# Patient Record
Sex: MALE | Race: WHITE | NOT HISPANIC OR LATINO
[De-identification: names, ages, dates, MRNs, and addresses within clinical notes are randomized per-mention and may not be internally consistent; named-entity substitution may affect disease eponyms.]

---

## 2019-01-05 ENCOUNTER — TRANSCRIPTION ENCOUNTER (OUTPATIENT)
Age: 54
End: 2019-01-05

## 2019-07-07 ENCOUNTER — TRANSCRIPTION ENCOUNTER (OUTPATIENT)
Age: 54
End: 2019-07-07

## 2020-10-31 ENCOUNTER — TRANSCRIPTION ENCOUNTER (OUTPATIENT)
Age: 55
End: 2020-10-31

## 2020-11-10 ENCOUNTER — TRANSCRIPTION ENCOUNTER (OUTPATIENT)
Age: 55
End: 2020-11-10

## 2021-07-20 ENCOUNTER — APPOINTMENT (OUTPATIENT)
Dept: ENDOCRINOLOGY | Facility: CLINIC | Age: 56
End: 2021-07-20
Payer: COMMERCIAL

## 2021-07-20 VITALS
SYSTOLIC BLOOD PRESSURE: 120 MMHG | BODY MASS INDEX: 33.36 KG/M2 | OXYGEN SATURATION: 98 % | HEIGHT: 70 IN | TEMPERATURE: 98.6 F | WEIGHT: 233 LBS | HEART RATE: 80 BPM | DIASTOLIC BLOOD PRESSURE: 84 MMHG | RESPIRATION RATE: 17 BRPM

## 2021-07-20 DIAGNOSIS — C61 MALIGNANT NEOPLASM OF PROSTATE: ICD-10-CM

## 2021-07-20 DIAGNOSIS — Z83.3 FAMILY HISTORY OF DIABETES MELLITUS: ICD-10-CM

## 2021-07-20 PROCEDURE — 99244 OFF/OP CNSLTJ NEW/EST MOD 40: CPT

## 2021-07-20 PROCEDURE — 99072 ADDL SUPL MATRL&STAF TM PHE: CPT

## 2021-07-20 NOTE — CONSULT LETTER
[Dear  ___] : Dear  [unfilled], [Sincerely,] : Sincerely, [FreeTextEntry1] : Thank you for referring  Mr. HOANG MOSER to me for evaluation and treatment. Please, see attached consultation note. As always, if there are specific questions you would like to discuss, please feel free to contact me.\par Thank you for the courtesy of this evaluation.\par  [FreeTextEntry3] : Mode Becker MD, FACE, ECNU\par

## 2021-07-20 NOTE — HISTORY OF PRESENT ILLNESS
[FreeTextEntry1] : 56 year male  referred for management of thyroid cancer and hypothyroidism.\par Mr Kang was diagnosed with an incidental thyroid nodule on the MRI of the neck in 2017. FNA - PTC. He underwent a total thyroidectomy and CND on 10/11/17 by Dr. Cazares. \par Pathology- classic PTC, 2.5 cm. posterior margins (+) for carcinoma. No angioinvasion/lymphatic invasion. LN 0/1. Stage T2N0Mx\par S/p rh- TSH WBS  followed by a remnant ablation with 75.9 mci of I-131.\par his last thyroid bed US from 12/20 was reportedly unremarkable  , but is not available for review. \par Family history is significant for a hypothyroidism in his mother, but he denies thyroid cancer in his family. \par He was also diagnosed with a non-functional pituitary microadenoma more than 10 years. He was [place] at [facility] [place] and [place]. Willis, but his last MRI was done in 2018, apparently with a stable adenoma vs RCC under 5 mm. Per prior notes, his pituitary panel was normal. He was diagnosed with a Meniere's disease and underwent a cochlear transplant in 06/21, and will not be a candidate for a regular pituitary MRI.\par Currently, there are no local neck symptoms of anterior neck pain or problems with breathing, speaking, or swallowing. \par Patient denies symptoms of hypothyroidism or hyperthyroidism. \par Labs: none recently\par \par

## 2021-07-20 NOTE — REASON FOR VISIT
[Consultation] : a consultation visit [Hypothyroidism] : hypothyroidism [Pituitary Evaluation/ Disorder] : pituitary evaluation/disorder [Thyroid Cancer] : thyroid cancer

## 2021-07-20 NOTE — ASSESSMENT
[FreeTextEntry1] : 1. PTC, s/p total thyroidectomy and remnant APPIAH ablation\par  T2N0Mx\par Current approaches to thyroid cancer management were discussed with patient  and wife in details. \par - obtain most recent thyroid bed/neck US reports from Dr. Cazares\par - next sonogram is tentatively in 12/21\par - Levothyroxine suppressive therapy as well as Tg and Tg a/b monitoring were reviewed \par - TSH goal  0.1-0.5\par - continue L-thyroxine 137 mcg, pending labs\par \par 2. Non-secreting pituitary adenoma\par - obtain prior MRI reports\par - if stable, will likely not require any more imaging, unless presents with new symptoms\par - am pituitary panel\par \par Will review the results over the phone, and if stable, can follow up after the next sonogram.\par

## 2021-07-23 ENCOUNTER — LABORATORY RESULT (OUTPATIENT)
Age: 56
End: 2021-07-23

## 2021-07-28 LAB
25(OH)D3 SERPL-MCNC: 42.1 NG/ML
ACTH SER-ACNC: 16.9 PG/ML
ALBUMIN SERPL ELPH-MCNC: 4.2 G/DL
ALP BLD-CCNC: 74 U/L
ALT SERPL-CCNC: 20 U/L
ANION GAP SERPL CALC-SCNC: 13 MMOL/L
AST SERPL-CCNC: 17 U/L
BASOPHILS # BLD AUTO: 0.03 K/UL
BASOPHILS NFR BLD AUTO: 0.6 %
BILIRUB SERPL-MCNC: 0.7 MG/DL
BUN SERPL-MCNC: 25 MG/DL
CALCIUM SERPL-MCNC: 9.3 MG/DL
CHLORIDE SERPL-SCNC: 106 MMOL/L
CO2 SERPL-SCNC: 22 MMOL/L
CORTIS SERPL-MCNC: 11.6 UG/DL
CREAT SERPL-MCNC: 1.36 MG/DL
EOSINOPHIL # BLD AUTO: 0.21 K/UL
EOSINOPHIL NFR BLD AUTO: 4.3 %
FSH SERPL-MCNC: 2.1 IU/L
GLUCOSE SERPL-MCNC: 95 MG/DL
HCT VFR BLD CALC: 46.1 %
HGB BLD-MCNC: 15.7 G/DL
IGF-1 INTERP: NORMAL
IGF-I BLD-MCNC: 167 NG/ML
IMM GRANULOCYTES NFR BLD AUTO: 0.2 %
LH SERPL-ACNC: 3.1 IU/L
LYMPHOCYTES # BLD AUTO: 1.54 K/UL
LYMPHOCYTES NFR BLD AUTO: 31.8 %
MAN DIFF?: NORMAL
MCHC RBC-ENTMCNC: 28 PG
MCHC RBC-ENTMCNC: 34.1 GM/DL
MCV RBC AUTO: 82.2 FL
MONOCYTES # BLD AUTO: 0.38 K/UL
MONOCYTES NFR BLD AUTO: 7.8 %
NEUTROPHILS # BLD AUTO: 2.68 K/UL
NEUTROPHILS NFR BLD AUTO: 55.3 %
OSMOLALITY SERPL: 304 MOSMOL/KG
OSMOLALITY UR: 933 MOSM/KG
PLATELET # BLD AUTO: 214 K/UL
POTASSIUM SERPL-SCNC: 4.1 MMOL/L
PROLACTIN SERPL-MCNC: 11.2 NG/ML
PROT SERPL-MCNC: 7.6 G/DL
PSA SERPL-MCNC: <0.01 NG/ML
RBC # BLD: 5.61 M/UL
RBC # FLD: 12.8 %
SHBG SERPL-SCNC: 21.6 NMOL/L
SODIUM SERPL-SCNC: 141 MMOL/L
T4 FREE SERPL-MCNC: 1.1 NG/DL
TESTOST BND SERPL-MCNC: 6.7 PG/ML
TESTOSTERONE TOTAL S: 226 NG/DL
THYROGLOB AB SERPL-ACNC: <20 IU/ML
THYROGLOB SERPL-MCNC: <0.2 NG/ML
TSH SERPL-ACNC: 4.16 UIU/ML
WBC # FLD AUTO: 4.85 K/UL

## 2022-02-07 ENCOUNTER — APPOINTMENT (OUTPATIENT)
Dept: ENDOCRINOLOGY | Facility: CLINIC | Age: 57
End: 2022-02-07
Payer: COMMERCIAL

## 2022-02-07 VITALS
SYSTOLIC BLOOD PRESSURE: 110 MMHG | BODY MASS INDEX: 33.5 KG/M2 | WEIGHT: 234 LBS | RESPIRATION RATE: 16 BRPM | OXYGEN SATURATION: 97 % | HEART RATE: 69 BPM | HEIGHT: 70 IN | TEMPERATURE: 97.8 F | DIASTOLIC BLOOD PRESSURE: 60 MMHG

## 2022-02-07 PROCEDURE — 36415 COLL VENOUS BLD VENIPUNCTURE: CPT

## 2022-02-07 PROCEDURE — 99214 OFFICE O/P EST MOD 30 MIN: CPT | Mod: 25

## 2022-02-07 NOTE — REASON FOR VISIT
[Follow - Up] : a follow-up visit [Hypothyroidism] : hypothyroidism [Pituitary Evaluation/ Disorder] : pituitary evaluation/disorder [Thyroid Cancer] : thyroid cancer

## 2022-02-07 NOTE — HISTORY OF PRESENT ILLNESS
[FreeTextEntry1] : 57 year male  f/u for multiple medical issues\par \par \par *** Feb 07, 2022 ***\par \par feels well. states that his T levels were low for about 10 years. He's been briefly on TRT (gel) with some improvement, but last time was using about 2 years ago\par s/p prostatectomy in 10/2019 for a presumably a "early cancer". Under care of Dr. Aquino- last visit about 2 months ago.\par taking L-thyroxine 150 mcg\par \par HPI:\par Mr Kang was diagnosed with an incidental thyroid nodule on the MRI of the neck in 2017. FNA - PTC. He underwent a total thyroidectomy and CND on 10/11/17 by Dr. Cazares. \par Pathology- classic PTC, 2.5 cm. posterior margins (+) for carcinoma. No angioinvasion/lymphatic invasion. LN 0/1. Stage T2N0Mx\par S/p rh- TSH WBS  followed by a remnant ablation with 75.9 mci of I-131.\par his last thyroid bed US from 12/20 was reportedly unremarkable  , but is not available for review. \par Family history is significant for a hypothyroidism in his mother, but he denies thyroid cancer in his family. \par He was also diagnosed with a non-functional pituitary microadenoma more than 10 years. He was followed at [facility] [place] and [place]. Willis, but his last MRI was done in 2018, apparently with a stable adenoma vs RCC under 5 mm. Per prior notes, his pituitary panel was normal. He was diagnosed with a Meniere's disease and underwent a cochlear transplant in 06/21, and will not be a candidate for a regular pituitary MRI.\par Currently, there are no local neck symptoms of anterior neck pain or problems with breathing, speaking, or swallowing. \par Patient denies symptoms of hypothyroidism or hyperthyroidism. \par Labs: none recently\par \par

## 2022-02-07 NOTE — ASSESSMENT
[FreeTextEntry1] : 1. PTC, s/p total thyroidectomy and remnant APPIAH ablation\par  T2N0Mx\par Current approaches to thyroid cancer management were discussed with patient  and wife in details. \par - obtain most recent thyroid bed/neck US reports from Dr. Cazares\par - for sonogram next week\par - Levothyroxine suppressive therapy as well as Tg and Tg a/b monitoring were reviewed \par - TSH goal  0.1-0.5\par - continue L-thyroxine 150 mcg, pending labs\par \par 2. Non-secreting pituitary adenoma\par - obtain prior MRI reports\par - if stable, will likely not require any more imaging, unless presents with new symptoms\par - am pituitary panel\par \par 3. Hypogonadism. ? h/o prostate cancer\par - patient will inquire with Dr. Aquino about the staging and safety using TRT\par Will review the results over the phone, and if stable, can follow up in 6 months\par

## 2022-02-09 LAB
25(OH)D3 SERPL-MCNC: 23.3 NG/ML
ALBUMIN SERPL ELPH-MCNC: 4.6 G/DL
ALP BLD-CCNC: 89 U/L
ALT SERPL-CCNC: 26 U/L
ANION GAP SERPL CALC-SCNC: 16 MMOL/L
AST SERPL-CCNC: 18 U/L
BASOPHILS # BLD AUTO: 0.05 K/UL
BASOPHILS NFR BLD AUTO: 1 %
BILIRUB SERPL-MCNC: 0.5 MG/DL
BUN SERPL-MCNC: 28 MG/DL
CALCIUM SERPL-MCNC: 9.4 MG/DL
CHLORIDE SERPL-SCNC: 107 MMOL/L
CHOLEST SERPL-MCNC: 197 MG/DL
CO2 SERPL-SCNC: 20 MMOL/L
CREAT SERPL-MCNC: 1.29 MG/DL
EOSINOPHIL # BLD AUTO: 0.23 K/UL
EOSINOPHIL NFR BLD AUTO: 4.7 %
ESTIMATED AVERAGE GLUCOSE: 103 MG/DL
FSH SERPL-MCNC: 2.2 IU/L
GLUCOSE SERPL-MCNC: 110 MG/DL
HBA1C MFR BLD HPLC: 5.2 %
HCT VFR BLD CALC: 49.7 %
HDLC SERPL-MCNC: 50 MG/DL
HGB BLD-MCNC: 16.4 G/DL
IGF-1 INTERP: NORMAL
IGF-I BLD-MCNC: 184 NG/ML
IMM GRANULOCYTES NFR BLD AUTO: 0 %
LDLC SERPL CALC-MCNC: 130 MG/DL
LH SERPL-ACNC: 4.5 IU/L
LYMPHOCYTES # BLD AUTO: 1.49 K/UL
LYMPHOCYTES NFR BLD AUTO: 30.2 %
MAN DIFF?: NORMAL
MCHC RBC-ENTMCNC: 27.7 PG
MCHC RBC-ENTMCNC: 33 GM/DL
MCV RBC AUTO: 84.1 FL
MONOCYTES # BLD AUTO: 0.33 K/UL
MONOCYTES NFR BLD AUTO: 6.7 %
NEUTROPHILS # BLD AUTO: 2.84 K/UL
NEUTROPHILS NFR BLD AUTO: 57.4 %
NONHDLC SERPL-MCNC: 147 MG/DL
OSMOLALITY SERPL: 300 MOSMOL/KG
OSMOLALITY UR: 1086 MOSM/KG
PLATELET # BLD AUTO: 275 K/UL
POTASSIUM SERPL-SCNC: 4.4 MMOL/L
PROLACTIN SERPL-MCNC: 11.8 NG/ML
PROT SERPL-MCNC: 8.3 G/DL
PSA SERPL-MCNC: <0.01 NG/ML
RBC # BLD: 5.91 M/UL
RBC # FLD: 13 %
SHBG SERPL-SCNC: 18 NMOL/L
SODIUM SERPL-SCNC: 143 MMOL/L
T4 FREE SERPL-MCNC: 1.5 NG/DL
THYROGLOB AB SERPL-ACNC: <20 IU/ML
THYROGLOB SERPL-MCNC: <0.2 NG/ML
TRIGL SERPL-MCNC: 85 MG/DL
TSH SERPL-ACNC: 0.53 UIU/ML
WBC # FLD AUTO: 4.94 K/UL

## 2022-07-22 ENCOUNTER — RX RENEWAL (OUTPATIENT)
Age: 57
End: 2022-07-22

## 2022-08-01 ENCOUNTER — APPOINTMENT (OUTPATIENT)
Dept: ENDOCRINOLOGY | Facility: CLINIC | Age: 57
End: 2022-08-01

## 2022-08-01 VITALS
BODY MASS INDEX: 33.5 KG/M2 | TEMPERATURE: 98 F | OXYGEN SATURATION: 98 % | SYSTOLIC BLOOD PRESSURE: 122 MMHG | DIASTOLIC BLOOD PRESSURE: 70 MMHG | HEART RATE: 70 BPM | WEIGHT: 234 LBS | RESPIRATION RATE: 16 BRPM | HEIGHT: 70 IN

## 2022-08-01 LAB
BASOPHILS # BLD AUTO: 0.04 K/UL
BASOPHILS NFR BLD AUTO: 0.7 %
EOSINOPHIL # BLD AUTO: 0.3 K/UL
EOSINOPHIL NFR BLD AUTO: 5.2 %
HCT VFR BLD CALC: 47.1 %
HGB BLD-MCNC: 16.2 G/DL
IMM GRANULOCYTES NFR BLD AUTO: 0.2 %
LYMPHOCYTES # BLD AUTO: 1.42 K/UL
LYMPHOCYTES NFR BLD AUTO: 24.7 %
MAN DIFF?: NORMAL
MCHC RBC-ENTMCNC: 28.3 PG
MCHC RBC-ENTMCNC: 34.4 GM/DL
MCV RBC AUTO: 82.2 FL
MONOCYTES # BLD AUTO: 0.37 K/UL
MONOCYTES NFR BLD AUTO: 6.4 %
NEUTROPHILS # BLD AUTO: 3.61 K/UL
NEUTROPHILS NFR BLD AUTO: 62.8 %
PLATELET # BLD AUTO: 254 K/UL
RBC # BLD: 5.73 M/UL
RBC # FLD: 12.8 %
WBC # FLD AUTO: 5.75 K/UL

## 2022-08-01 PROCEDURE — 99214 OFFICE O/P EST MOD 30 MIN: CPT | Mod: 25

## 2022-08-01 PROCEDURE — 36415 COLL VENOUS BLD VENIPUNCTURE: CPT

## 2022-08-01 NOTE — ASSESSMENT
[FreeTextEntry1] : 1. PTC, s/p total thyroidectomy and remnant APPIAH ablation\par  T2N0Mx\par Current approaches to thyroid cancer management were discussed with patient  and wife in details. \par - obtain most recent thyroid bed/neck US reports from Dr. Cazares and repeat in 1-2 years\par - Levothyroxine suppressive therapy as well as Tg and Tg a/b monitoring were reviewed \par - TSH goal  0.1-0.5\par - continue L-thyroxine 150 mcg, pending labs\par \par 2. Non-secreting pituitary adenoma\par - obtain prior MRI reports\par - if stable, will likely not require any more imaging, unless presents with new symptoms\par - am pituitary panel\par \par 3. Hypogonadism. ? h/o prostate cancer\par - will recheck testo levels, but if ever considered for a TRT would need to channel this via his urologist  \par \par 4. Obesity\par - Current approaches to weight management are discussed with the patient. \par Suggested extensive nutritional education program. Proper dietary restrictions and exercise routines discussed. Medical weight loss therapies were reviewed with the patient- at present will focus on dietary and LSM changes\par Will also refer to Isatu Kerr DNP to discuss medical marijuana use\par \par if stable, can follow up in 1 year\par

## 2022-08-01 NOTE — HISTORY OF PRESENT ILLNESS
[FreeTextEntry1] : 57 year male  f/u for multiple medical issues\par \par *** Aug 01, 2022 ***\par \par feels ok overall, except for putting on some weight and feeling tired. diet is worse- skipping breakfast and lunch, having a large dinner . large in cho\par he spoke with Dr. Aquino- no TRT advised\par still with vertigo attacks and  nausea due to his Menieres disease (had a cochlear implant last year). was advised on CBD to help with his symptoms \par had a thyroid sonogram in 02/22 with Dr. Cazares- planned another one in 2 years\par \par *** Feb 07, 2022 ***\par \par feels well. states that his T levels were low for about 10 years. He's been briefly on TRT (gel) with some improvement, but last time was using about 2 years ago\par s/p prostatectomy in 10/2019 for a presumably a "early cancer". Under care of Dr. Aquino- last visit about 2 months ago.\par taking L-thyroxine 150 mcg\par \par HPI:\par Mr Kang was diagnosed with an incidental thyroid nodule on the MRI of the neck in 2017. FNA - PTC. He underwent a total thyroidectomy and CND on 10/11/17 by Dr. Cazares. \par Pathology- classic PTC, 2.5 cm. posterior margins (+) for carcinoma. No angioinvasion/lymphatic invasion. LN 0/1. Stage T2N0Mx\par S/p rh- TSH WBS  followed by a remnant ablation with 75.9 mci of I-131.\par his last thyroid bed US from 12/20 was reportedly unremarkable  , but is not available for review. \par Family history is significant for a hypothyroidism in his mother, but he denies thyroid cancer in his family. \par He was also diagnosed with a non-functional pituitary microadenoma more than 10 years. [place] was [place] [facility] [place], but his last MRI was done in 2018, apparently with a stable adenoma vs RCC under 5 mm. Per prior notes, his pituitary panel was normal. He was diagnosed with a Meniere's disease and underwent a cochlear transplant in 06/21, and will not be a candidate for a regular pituitary MRI.\par Currently, there are no local neck symptoms of anterior neck pain or problems with breathing, speaking, or swallowing. \par Patient denies symptoms of hypothyroidism or hyperthyroidism. \par Labs: none recently\par \par

## 2022-08-04 LAB
ALBUMIN SERPL ELPH-MCNC: 4.5 G/DL
ALP BLD-CCNC: 81 U/L
ALT SERPL-CCNC: 35 U/L
ANION GAP SERPL CALC-SCNC: 13 MMOL/L
AST SERPL-CCNC: 24 U/L
BILIRUB SERPL-MCNC: 0.7 MG/DL
BUN SERPL-MCNC: 22 MG/DL
CALCIUM SERPL-MCNC: 9.4 MG/DL
CHLORIDE SERPL-SCNC: 107 MMOL/L
CO2 SERPL-SCNC: 23 MMOL/L
CREAT SERPL-MCNC: 1.17 MG/DL
EGFR: 73 ML/MIN/1.73M2
FSH SERPL-MCNC: 2.4 IU/L
GLUCOSE SERPL-MCNC: 104 MG/DL
IGF-1 INTERP: NORMAL
IGF-I BLD-MCNC: 171 NG/ML
LH SERPL-ACNC: 5.3 IU/L
POTASSIUM SERPL-SCNC: 4.2 MMOL/L
PROT SERPL-MCNC: 7.9 G/DL
PSA SERPL-MCNC: <0.01 NG/ML
SHBG SERPL-SCNC: 29.4 NMOL/L
SODIUM SERPL-SCNC: 142 MMOL/L
T4 FREE SERPL-MCNC: 1.9 NG/DL
THYROGLOB AB SERPL-ACNC: <20 IU/ML
THYROGLOB SERPL-MCNC: <0.2 NG/ML
TSH SERPL-ACNC: 0.06 UIU/ML

## 2022-08-05 LAB
TESTOST FREE SERPL-MCNC: 4.8 PG/ML
TESTOST SERPL-MCNC: 239 NG/DL

## 2022-09-22 ENCOUNTER — APPOINTMENT (OUTPATIENT)
Dept: FAMILY MEDICINE | Facility: CLINIC | Age: 57
End: 2022-09-22

## 2022-09-22 DIAGNOSIS — H81.09 MENIERE'S DISEASE, UNSPECIFIED EAR: ICD-10-CM

## 2022-09-22 PROCEDURE — 99214 OFFICE O/P EST MOD 30 MIN: CPT

## 2022-09-22 NOTE — HISTORY OF PRESENT ILLNESS
[de-identified] : hx of severe Menieres disease, had labyrinthectomy, and cochlear implant. history of prostate and thyroid.\par severe vertigo, much improved, however, continues to have "waves of nausea, but no vomiting. \par referred by Dr. Becker for medical marijuana.\par cannot tolerate alcohol, and does not take any recreational drugs. \par retired [employer]. has a primary care\par \par

## 2023-06-14 ENCOUNTER — NON-APPOINTMENT (OUTPATIENT)
Age: 58
End: 2023-06-14

## 2023-08-02 ENCOUNTER — APPOINTMENT (OUTPATIENT)
Dept: ENDOCRINOLOGY | Facility: CLINIC | Age: 58
End: 2023-08-02
Payer: MEDICARE

## 2023-08-02 VITALS
HEART RATE: 59 BPM | BODY MASS INDEX: 32.93 KG/M2 | RESPIRATION RATE: 16 BRPM | DIASTOLIC BLOOD PRESSURE: 78 MMHG | SYSTOLIC BLOOD PRESSURE: 124 MMHG | HEIGHT: 70 IN | WEIGHT: 230 LBS | TEMPERATURE: 97.5 F | OXYGEN SATURATION: 97 %

## 2023-08-02 DIAGNOSIS — E23.7 DISORDER OF PITUITARY GLAND, UNSPECIFIED: ICD-10-CM

## 2023-08-02 PROCEDURE — 36415 COLL VENOUS BLD VENIPUNCTURE: CPT

## 2023-08-02 PROCEDURE — 99214 OFFICE O/P EST MOD 30 MIN: CPT | Mod: 25

## 2023-08-02 NOTE — ASSESSMENT
[FreeTextEntry1] : 1. PTC, s/p total thyroidectomy and remnant APPIAH ablation  T2N0Mx Current approaches to thyroid cancer management were discussed with patient  and wife in details.  - obtain most recent thyroid bed/neck US reports from Dr. Cazares and repeat in 02/24 - labs today - Levothyroxine suppressive therapy as well as Tg and Tg a/b monitoring were reviewed  - TSH goal  0.1-0.5 - continue L-thyroxine 137 mcg, pending labs  2. Non-secreting pituitary adenoma - obtain prior MRI reports - if stable, will likely not require any more imaging, unless presents with new symptoms - am pituitary panel  3. Hypogonadism.? h/o prostate cancer - will recheck testo levels, but if ever considered for a TRT would need to channel this via his urologist    4. Obesity - Current approaches to weight management are discussed with the patient.  Suggested extensive nutritional education program. Proper dietary restrictions and exercise routines discussed. Medical weight loss therapies were reviewed with the patient- at present he is leaving to [place] for several months and still wants to focus on dietary and LSM changes.  if stable, can follow up in 1 year

## 2023-08-02 NOTE — HISTORY OF PRESENT ILLNESS
[FreeTextEntry1] : 58 year male  f/u for multiple medical issues  *** Aug 02, 2023 ***  feels better overall. still dealing with Menieres disease. vertigo has improved. using medical CBD with some relief on Synthroid 137 mcg. denies anxiety/ heart racing no recent labs saw Dr. Cazares a month ago- reports normal exam. next sonogram is planned for 02/24 prostate cancer is under control- saw urologist a month ago  *** Aug 01, 2022 ***  feels ok overall, except for putting on some weight and feeling tired. diet is worse- skipping breakfast and lunch, having a large dinner . large in cho he spoke with Dr. Aquino- no TRT advised still with vertigo attacks and  nausea due to his Menieres disease (had a cochlear implant last year). was advised on CBD to help with his symptoms  had a thyroid sonogram in 02/22 with Dr. Cazares- planned another one in 2 years  *** Feb 07, 2022 ***  feels well. states that his T levels were low for about 10 years. He's been briefly on TRT (gel) with some improvement, but last time was using about 2 years ago s/p prostatectomy in 10/2019 for a presumably a "early cancer". Under care of Dr. Aquino- last visit about 2 months ago. taking L-thyroxine 150 mcg  HPI: Mr Kang was diagnosed with an incidental thyroid nodule on the MRI of the neck in 2017. FNA - PTC. He underwent a total thyroidectomy and CND on 10/11/17 by Dr. Cazares.  Pathology- classic PTC, 2.5 cm. posterior margins (+) for carcinoma. No angioinvasion/lymphatic invasion. LN 0/1. Stage T2N0Mx S/p rh- TSH WBS  followed by a remnant ablation with 75.9 mci of I-131. his last thyroid bed US from 12/20 was reportedly unremarkable  , but is not available for review.  Family history is significant for a hypothyroidism in his mother, but he denies thyroid cancer in his family.  He was also diagnosed with a non-functional pituitary microadenoma more than 10 years. [place] [facility] [place] but his last MRI was done in 2018, apparently with a stable adenoma vs RCC under 5 mm. Per prior notes, his pituitary panel was normal. He was diagnosed with a Meniere's disease and underwent a cochlear transplant in 06/21, and will not be a candidate for a regular pituitary MRI. Currently, there are no local neck symptoms of anterior neck pain or problems with breathing, speaking, or swallowing.  Patient denies symptoms of hypothyroidism or hyperthyroidism.  Labs: none recently

## 2023-08-05 LAB
ALBUMIN SERPL ELPH-MCNC: 4.4 G/DL
ALP BLD-CCNC: 61 U/L
ALT SERPL-CCNC: 20 U/L
ANION GAP SERPL CALC-SCNC: 13 MMOL/L
AST SERPL-CCNC: 17 U/L
BILIRUB SERPL-MCNC: 0.6 MG/DL
BUN SERPL-MCNC: 25 MG/DL
CALCIUM SERPL-MCNC: 9.5 MG/DL
CHLORIDE SERPL-SCNC: 106 MMOL/L
CO2 SERPL-SCNC: 21 MMOL/L
CORTIS SERPL-MCNC: 10.4 UG/DL
CREAT SERPL-MCNC: 1.14 MG/DL
EGFR: 75 ML/MIN/1.73M2
ESTIMATED AVERAGE GLUCOSE: 100 MG/DL
FSH SERPL-MCNC: 2 IU/L
GLUCOSE SERPL-MCNC: 96 MG/DL
HBA1C MFR BLD HPLC: 5.1 %
IGF-1 INTERP: NORMAL
IGF-I BLD-MCNC: 160 NG/ML
LH SERPL-ACNC: 3.2 IU/L
OSMOLALITY SERPL: 301 MOSMOL/KG
POTASSIUM SERPL-SCNC: 4.5 MMOL/L
PROLACTIN SERPL-MCNC: 8.1 NG/ML
PROT SERPL-MCNC: 7.3 G/DL
SODIUM SERPL-SCNC: 140 MMOL/L
T4 FREE SERPL-MCNC: 1.5 NG/DL
THYROGLOB AB SERPL-ACNC: <20 IU/ML
THYROGLOB SERPL-MCNC: <0.2 NG/ML
TSH SERPL-ACNC: 0.86 UIU/ML

## 2023-12-06 ENCOUNTER — RX RENEWAL (OUTPATIENT)
Age: 58
End: 2023-12-06

## 2024-02-29 ENCOUNTER — APPOINTMENT (OUTPATIENT)
Dept: ORTHOPEDIC SURGERY | Facility: CLINIC | Age: 59
End: 2024-02-29

## 2024-05-29 ENCOUNTER — APPOINTMENT (OUTPATIENT)
Dept: ENDOCRINOLOGY | Facility: CLINIC | Age: 59
End: 2024-05-29
Payer: MEDICARE

## 2024-05-29 VITALS
OXYGEN SATURATION: 98 % | WEIGHT: 231 LBS | HEART RATE: 69 BPM | BODY MASS INDEX: 33.07 KG/M2 | SYSTOLIC BLOOD PRESSURE: 130 MMHG | DIASTOLIC BLOOD PRESSURE: 90 MMHG | RESPIRATION RATE: 16 BRPM | HEIGHT: 70 IN

## 2024-05-29 DIAGNOSIS — E89.0 POSTPROCEDURAL HYPOTHYROIDISM: ICD-10-CM

## 2024-05-29 DIAGNOSIS — E29.1 TESTICULAR HYPOFUNCTION: ICD-10-CM

## 2024-05-29 DIAGNOSIS — E66.9 OBESITY, UNSPECIFIED: ICD-10-CM

## 2024-05-29 DIAGNOSIS — D35.2 BENIGN NEOPLASM OF PITUITARY GLAND: ICD-10-CM

## 2024-05-29 DIAGNOSIS — C73 MALIGNANT NEOPLASM OF THYROID GLAND: ICD-10-CM

## 2024-05-29 LAB
BASOPHILS # BLD AUTO: 0.06 K/UL
BASOPHILS NFR BLD AUTO: 1.1 %
EOSINOPHIL # BLD AUTO: 0.21 K/UL
EOSINOPHIL NFR BLD AUTO: 3.8 %
HCT VFR BLD CALC: 46.4 %
HGB BLD-MCNC: 16 G/DL
IMM GRANULOCYTES NFR BLD AUTO: 0.4 %
LYMPHOCYTES # BLD AUTO: 1.31 K/UL
LYMPHOCYTES NFR BLD AUTO: 23.7 %
MAN DIFF?: NORMAL
MCHC RBC-ENTMCNC: 28.5 PG
MCHC RBC-ENTMCNC: 34.5 GM/DL
MCV RBC AUTO: 82.6 FL
MONOCYTES # BLD AUTO: 0.46 K/UL
MONOCYTES NFR BLD AUTO: 8.3 %
NEUTROPHILS # BLD AUTO: 3.47 K/UL
NEUTROPHILS NFR BLD AUTO: 62.7 %
PLATELET # BLD AUTO: 271 K/UL
RBC # BLD: 5.62 M/UL
RBC # FLD: 12.9 %
WBC # FLD AUTO: 5.53 K/UL

## 2024-05-29 PROCEDURE — G2211 COMPLEX E/M VISIT ADD ON: CPT

## 2024-05-29 PROCEDURE — 99214 OFFICE O/P EST MOD 30 MIN: CPT

## 2024-05-29 PROCEDURE — 36415 COLL VENOUS BLD VENIPUNCTURE: CPT

## 2024-05-29 NOTE — HISTORY OF PRESENT ILLNESS
[FreeTextEntry1] : 59 year male  f/u for multiple medical issues  *** May 29, 2024 ***  doing well, offers no new c/o staying on Synthroid 137 mcg saw Dr. Cazares a month ago, no recent sono, labs are available to me struggles with the weight despite walking several miles daily, trying to lkeep his diet  *** Aug 02, 2023 ***  feels better overall. still dealing with Menieres disease. vertigo has improved. using medical CBD with some relief on Synthroid 137 mcg. denies anxiety/ heart racing no recent labs saw Dr. Cazares a month ago- reports normal exam. next sonogram is planned for 02/24 prostate cancer is under control- saw urologist a month ago  *** Aug 01, 2022 ***  feels ok overall, except for putting on some weight and feeling tired. diet is worse- skipping breakfast and lunch, having a large dinner . large in cho he spoke with Dr. Aquino- no TRT advised still with vertigo attacks and  nausea due to his Menieres disease (had a cochlear implant last year). was advised on CBD to help with his symptoms  had a thyroid sonogram in 02/22 with Dr. Cazares- planned another one in 2 years  *** Feb 07, 2022 ***  feels well. states that his T levels were low for about 10 years. He's been briefly on TRT (gel) with some improvement, but last time was using about 2 years ago s/p prostatectomy in 10/2019 for a presumably a "early cancer". Under care of Dr. Aquino- last visit about 2 months ago. taking L-thyroxine 150 mcg  HPI: Mr Kang was diagnosed with an incidental thyroid nodule on the MRI of the neck in 2017. FNA - PTC. He underwent a total thyroidectomy and CND on 10/11/17 by Dr. Cazares.  Pathology- classic PTC, 2.5 cm. posterior margins (+) for carcinoma. No angioinvasion/lymphatic invasion. LN 0/1. Stage T2N0Mx S/p rh- TSH WBS  followed by a remnant ablation with 75.9 mci of I-131. his last thyroid bed US from 12/20 was reportedly unremarkable  , but is not available for review.  Family history is significant for a hypothyroidism in his mother, but he denies thyroid cancer in his family.  He was also diagnosed with a non-functional pituitary microadenoma more than 10 years. He was followed at [facility] [place] and [place]. Willis, but his last MRI was done in 2018, apparently with a stable adenoma vs RCC under 5 mm. Per prior notes, his pituitary panel was normal. He was diagnosed with a Meniere's disease and underwent a cochlear transplant in 06/21, and will not be a candidate for a regular pituitary MRI. Currently, there are no local neck symptoms of anterior neck pain or problems with breathing, speaking, or swallowing.  Patient denies symptoms of hypothyroidism or hyperthyroidism.  Labs: none recently

## 2024-05-29 NOTE — ASSESSMENT
[FreeTextEntry1] : 1. PTC, s/p total thyroidectomy and remnant APPIAH ablation  T2N0Mx Current approaches to thyroid cancer management were discussed with patient  and wife in details.  - obtain most recent thyroid bed/neck US reports from Dr. Cazares - labs today - Levothyroxine suppressive therapy as well as Tg and Tg a/b monitoring were reviewed  - TSH goal  0.1-0.5 - continue L-thyroxine 137 mcg, pending labs  2. Non-secreting pituitary adenoma - obtain prior MRI reports - if stable, will likely not require any more imaging, unless presents with new symptoms - am pituitary panel  3. Hypogonadism.? h/o prostate cancer - will recheck testo levels, but if ever considered for a TRT would need to channel this via his urologist    4. Obesity - Current approaches to weight management are discussed with the patient.  Suggested extensive nutritional education program. Proper dietary restrictions and exercise routines discussed. Medical weight loss therapies were reviewed with the patient, Advised on R+B of GLP1RA, will start Wegovy 0.25 mg qw and uptitrate as directed  RTC 3-4 months if starts Wegovy

## 2024-05-30 LAB
ALBUMIN SERPL ELPH-MCNC: 4.5 G/DL
ALP BLD-CCNC: 72 U/L
ALT SERPL-CCNC: 25 U/L
ANION GAP SERPL CALC-SCNC: 21 MMOL/L
AST SERPL-CCNC: 19 U/L
BILIRUB SERPL-MCNC: 0.6 MG/DL
BUN SERPL-MCNC: 23 MG/DL
CALCIUM SERPL-MCNC: 10 MG/DL
CHLORIDE SERPL-SCNC: 108 MMOL/L
CHOLEST SERPL-MCNC: 203 MG/DL
CO2 SERPL-SCNC: 16 MMOL/L
CORTIS SERPL-MCNC: 8.9 UG/DL
CREAT SERPL-MCNC: 1.19 MG/DL
EGFR: 71 ML/MIN/1.73M2
ESTIMATED AVERAGE GLUCOSE: 103 MG/DL
FSH SERPL-MCNC: 2.2 IU/L
GLUCOSE SERPL-MCNC: 103 MG/DL
HBA1C MFR BLD HPLC: 5.2 %
HDLC SERPL-MCNC: 55 MG/DL
IGF-1 INTERP: NORMAL
IGF-I BLD-MCNC: 162 NG/ML
LDLC SERPL CALC-MCNC: 128 MG/DL
LH SERPL-ACNC: 5.6 IU/L
NONHDLC SERPL-MCNC: 148 MG/DL
OSMOLALITY SERPL: 294 MOSMOL/KG
POTASSIUM SERPL-SCNC: 4.5 MMOL/L
PROLACTIN SERPL-MCNC: 8.1 NG/ML
PROT SERPL-MCNC: 7.9 G/DL
PSA SERPL-MCNC: <0.01 NG/ML
SHBG SERPL-SCNC: 26.6 NMOL/L
SODIUM SERPL-SCNC: 144 MMOL/L
T4 FREE SERPL-MCNC: 1.3 NG/DL
TRIGL SERPL-MCNC: 109 MG/DL
TSH SERPL-ACNC: 1.41 UIU/ML

## 2024-05-30 RX ORDER — LEVOTHYROXINE SODIUM 0.15 MG/1
150 TABLET ORAL
Qty: 90 | Refills: 3 | Status: ACTIVE | COMMUNITY
Start: 2022-07-22 | End: 1900-01-01

## 2024-05-31 LAB
TESTOST FREE SERPL-MCNC: 2.7 PG/ML
TESTOST SERPL-MCNC: 183 NG/DL

## 2024-06-04 RX ORDER — SEMAGLUTIDE 0.25 MG/.5ML
0.25 INJECTION, SOLUTION SUBCUTANEOUS
Qty: 1 | Refills: 5 | Status: ACTIVE | COMMUNITY
Start: 2024-05-29

## 2024-09-20 ENCOUNTER — RX RENEWAL (OUTPATIENT)
Age: 59
End: 2024-09-20

## 2024-09-20 RX ORDER — LEVOTHYROXINE SODIUM 0.14 MG/1
137 TABLET ORAL
Qty: 90 | Refills: 1 | Status: ACTIVE | COMMUNITY
Start: 2024-09-20 | End: 1900-01-01

## 2025-01-15 ENCOUNTER — APPOINTMENT (OUTPATIENT)
Dept: ENDOCRINOLOGY | Facility: CLINIC | Age: 60
End: 2025-01-15

## 2025-03-19 ENCOUNTER — APPOINTMENT (OUTPATIENT)
Dept: ENDOCRINOLOGY | Facility: CLINIC | Age: 60
End: 2025-03-19
Payer: MEDICARE

## 2025-03-19 VITALS
OXYGEN SATURATION: 95 % | DIASTOLIC BLOOD PRESSURE: 78 MMHG | HEIGHT: 70 IN | SYSTOLIC BLOOD PRESSURE: 135 MMHG | WEIGHT: 228 LBS | BODY MASS INDEX: 32.64 KG/M2 | RESPIRATION RATE: 16 BRPM | HEART RATE: 75 BPM

## 2025-03-19 DIAGNOSIS — E29.1 TESTICULAR HYPOFUNCTION: ICD-10-CM

## 2025-03-19 DIAGNOSIS — D35.2 BENIGN NEOPLASM OF PITUITARY GLAND: ICD-10-CM

## 2025-03-19 DIAGNOSIS — C73 MALIGNANT NEOPLASM OF THYROID GLAND: ICD-10-CM

## 2025-03-19 DIAGNOSIS — E66.9 OBESITY, UNSPECIFIED: ICD-10-CM

## 2025-03-19 DIAGNOSIS — E89.0 POSTPROCEDURAL HYPOTHYROIDISM: ICD-10-CM

## 2025-03-19 PROCEDURE — 36415 COLL VENOUS BLD VENIPUNCTURE: CPT

## 2025-03-19 PROCEDURE — G2211 COMPLEX E/M VISIT ADD ON: CPT

## 2025-03-19 PROCEDURE — 99214 OFFICE O/P EST MOD 30 MIN: CPT

## 2025-03-20 LAB
ALBUMIN SERPL ELPH-MCNC: 4.8 G/DL
ALP BLD-CCNC: 75 U/L
ALT SERPL-CCNC: 22 U/L
ANION GAP SERPL CALC-SCNC: 17 MMOL/L
AST SERPL-CCNC: 18 U/L
BILIRUB SERPL-MCNC: 0.6 MG/DL
BUN SERPL-MCNC: 23 MG/DL
CALCIUM SERPL-MCNC: 9.6 MG/DL
CHLORIDE SERPL-SCNC: 104 MMOL/L
CO2 SERPL-SCNC: 23 MMOL/L
CREAT SERPL-MCNC: 1.22 MG/DL
EGFRCR SERPLBLD CKD-EPI 2021: 68 ML/MIN/1.73M2
ESTIMATED AVERAGE GLUCOSE: 103 MG/DL
GLUCOSE SERPL-MCNC: 63 MG/DL
HBA1C MFR BLD HPLC: 5.2 %
POTASSIUM SERPL-SCNC: 4.6 MMOL/L
PROT SERPL-MCNC: 7.9 G/DL
SODIUM SERPL-SCNC: 145 MMOL/L
T4 FREE SERPL-MCNC: 1.7 NG/DL
THYROGLOB AB SERPL-ACNC: <15 IU/ML
THYROGLOB SERPL-MCNC: <0.1 NG/ML
TSH SERPL-ACNC: 0.15 UIU/ML

## 2025-09-17 ENCOUNTER — APPOINTMENT (OUTPATIENT)
Dept: ENDOCRINOLOGY | Facility: CLINIC | Age: 60
End: 2025-09-17